# Patient Record
(demographics unavailable — no encounter records)

---

## 2024-10-10 NOTE — REASON FOR VISIT
[Initial] : initial visit for [Modified Barium Swallow] : modified barium swallow [FreeTextEntry1] : Dr. Joel Granado

## 2024-10-10 NOTE — ASSESSMENT
[FreeTextEntry1] : MODIFIED BARIUM SWALLOW STUDY/VIDEOFLUOROSCOPIC SWALLOW STUDY  Type of Evaluation & Procedure Code: Motion Flouro Evaluation of Swallow Function CPT code 68110   Patient Name: Chemo Al   Date of Exam: 10/10/2024  YOB: 2015  Referring Physician: Dr. Joel Granado   Referring Physician Specialty: Otolaryngology   Medical Diagnosis: Dysphagia, idiopathic (R13.10)   Treatment Diagnosis: Oropharyngeal dysphagia (R13.12)   Date of onset: Birth    REASON FOR REFERRAL:  Chemo Al, a 9-year-old male, was referred for a Modified Barium Swallow Study to objectively assess oropharyngeal swallow function and rule out silent penetration/aspiration due to recurrent croup.  Patient was accompanied to the evaluation by his mother, who provided the case history information, and served as a reliable informant. Current Feeding complaints reported by caregiver included selective eating.    BIRTH & MEDICAL HISTORY: Birth and Medical history gathered via parent interview and through review of electronic medical record.  Patient was born full term via normal vaginal route. No delivery/maternal complications reported, however patient with 2-day NICU stay due to jaundice. Medical history is significant for recurrent croup. No surgical history, hospital admissions, or history of intubation.    Current Respiratory Status: Room Air   Medications: Claritin as needed for seasonal allergies   Medical allergies: No known Medical allergies reported   Food allergies: No known food allergies reported    THERAPEUTIC HISTORY:  Per report, patient does not presently participate in therapeutic intervention.   Results of Previous Modified Barium Swallow Study (MBSS)/Videofluoroscopic Swallow Studies (VFSS):  None reported   Results of Previous Clinical swallow evaluations:  [facility] 10/3/2024: "DIET/FLUID RECOMMENDED CONSISTENCIES:  Continue oral feedings of thin fluids (IDDSI LEVEL 0) and regular solids (IDDSI LEVEL 7)." Please refer to patient's chart for full report.    FEEDING HISTORY:  Current Diet (based on the International Dysphagia Diet Standardization initiative [IDDSI]):  Exclusive oral diet feedings of regular solids (Level 7) and thin liquids (Level 0)   Feeding Method: Independent in self-feeding  Reported Endurance During Meals: Good  Feeding utensils: Appropriate feeding utensils  Feeding schedule: 3 meals  Temperature preference: No preference reported  Length of time taken to complete a feedin-45 minutes due to avoidance behaviors and distractions  History of feeding tube: None reported   ASSESSMENT:  Mental Status: Alert, responsive, and cooperative  Mobility Status: Ambulatory   POSTURAL CONTROL & MOVEMENT PATTERN:  Head Control: Within functional limits   Trunk Control: Within functional limits   Involuntary movement: Not observed      ORAL MOTOR ASSESSMENT:  A cursory oral mechanism examination of was conducted. Patient presented with facial symmetry and closed mouth posture while at rest.  Dentition: Within functional limits for age.  Oral Mucosa: Moist  Buccal: Within functional limits  Palate: Within functional limits  Labial: Adequate retraction and protrusion   Lingual: Adequate strength tone, and range of motion   Testing Tongue Position: Within functional limits  Oral Sensory: Within functional limits  Vocal Quality was perceptually judged to be within functional limits based on conversation   Reflexes: At this time, clinician did not elicit gag reflex.   MODIFIED BARIUM SWALLOW STUDY (MBSS)/VIDEOFLOUROSCOPIC SWALLOW STUDY (VFSS)ASSESSMENT:  The patient was assessed standing upright in both the lateral and anterior-posterior plane in the [facility], with Radiologist present.    Respiratory Status during Evaluation: Room Air   Patient's volitional cough was noted to be strong   Patient's volitional throat clear was noted to be strong   Secretion Management: Within functional limits   There was no coughing, no throat clearing, and [ ]no wet/gurgly vocal quality prior to PO administration.  The patient was alert and cooperative.   Preliminary Fluoroscopic Findings:  Adequate bilateral vocal fold motion during phonation.   Adequate velopharyngeal motion during phonation.   Adequate laryngeal elevation, pharyngeal constriction and epiglottic deflection during baseline swallow.   VFSS/MBS Eval: Solid Trials:   Consistencies Administered:  Regular (Level 7)    Feeding Method: Self-fed   Positioning: Standing   Endurance during meal/trials: Good   Oral Preparatory Stage for Solids:  Within functional limits. Oral preparatory phase marked by adequate acceptance, cohesive bolus formation, and age-appropriate mastication skill marked by adequate bite, lateralization of bolus to molars and demonstration of rotary chew pattern.    Oral Phase for Solids:  Within functional limits. Patient's oral phase was marked by coordinated oral motor movements with adequate preparation and manipulation of regular solid consistencies. Adequate anterior posterior movement of bolus, timely oral transit time, and adequate clearance of bolus from oral cavity appreciated.    Pharyngeal Phase for Solids:   Within Functional Limits  Pharyngeal stage was marked by   Intact: Yes   Initiation of the pharyngeal swallow: Within functional limits  Hyolaryngeal elevation: Adequate  Epiglottic retroflexion: Adequate   Pharyngeal transit time: Timely  Laryngeal penetration: not viewed  Aspiration: not viewed   Integrity of cricopharyngeal opening: yes   Residue: not viewed     Esophageal Phase:   Backflow not observed Please refer to physician's report with regard to details for esophageal stage of swallow.    VFSS/MBS Eval: Fluid Trials:  Consistencies Administered:  Thin (Level 0) via standard straw and open cup   Slightly Thick (Level 1) via standard straw    Feeding Method: Self-fed   Positioning: Standing   Endurance during meal/trials: Good    Oral Preparatory Stage for Fluids   Patient's oral preparatory phase was marked by adequate acceptance.  Age-appropriate cup drinking skill and age-appropriate straw drinking skill marked by adequate ability to create and maintain intraoral gradient pressure for fluid expression upward in straw. Cohesive bolus formation appreciated.    Oral Stage for Fluids:  Patient's oral stage was marked by coordinated lingual movements as he demonstrated good control and containment of thin fluids. Adequate anterior posterior movement of bolus, timely oral transfer, and adequate clearance appreciated.    Pharyngeal Phase for Fluids:    Within Functional Limits  Pharyngeal stage was marked by   Intact: Yes   Initiation of the pharyngeal swallow: Within functional limits  Hyolaryngeal elevation: Adequate  Epiglottic retroflexion: Adequate   Pharyngeal transit time: Timely   Laryngeal penetration:  -Thin (Level 0) via standard straw: 1 instance of trace, silent penetration with spontaneous retrieval viewed initial swallow. NOT viewed for multiple subsequent trials.  -Thin (Level 0) via open cup: not viewed  -Slightly thick (Level 1): not viewed    Aspiration: Not viewed   Integrity of cricopharyngeal opening: yes   Trace residue: not viewed    Esophageal Phase:   Backflow not observed Please refer to physician's report with regard to details for esophageal stage of swallow.    ROSENBECK'S ASPIRATION-PENETRATION SCALE  Aspiration - Penetration Scale    (Foziabek et al Dysphagia 11:93-98 (1996), Aspiration-Penetration Scale)  1.    Material does not enter the airway  2.    Material enters the airway, remains above the vocal folds, and is ejected from the airway  3.    Material enters the airway, remains above the vocal folds, and is not ejected  4.    Material enters the airway, contacts the vocal folds, and is ejected from the airway  5.    Material enters the airway, contacts the vocal folds, and is not ejected from the airway  6.    Material enters the airway, passes below the vocal folds and is ejected into the larynx or out of the airway  7.    Material enters the airway, passes below the vocal folds, and is not ejected from the trachea despite effort  8.    Material enters the airway, passes below the vocal folds, and no effort is made to eject   TRIALS ADMINISTERED AND SEVERITY SCALE:   1-Regular (Level 7)   1- Slightly Thick (Level 1)  1-Thin (Level 0) via open cup   2-Thin (Level 0) via standard straw    PROGNOSIS:  Prognosis is good for safe and adequate oral intake of the recommended consistencies as outlined below, based on the results of today's assessment and the medical history reported.   EDUCATION:  Discussed results of evaluation with mother   Mother expressed understanding of evaluation and agreement with goals and treatment plan  Provided written recommendations  Provided [facility]eding[facility]Swallowing referral list   IMPRESSIONS: Chemo Al, a 9-year-old male, was referred for a Modified Barium Swallow Study to objectively assess oropharyngeal swallow function and rule out silent penetration/aspiration due to recurrent croup. Patient presents with intact oropharyngeal swallow function. Oral phase notable for coordinated oral motor movements. Age-appropriate mastication skill appreciated for regular solid consistencies and adequate control and containment of thin fluids.  Pharyngeal phase marked by timely pharyngeal swallow initiation, adequate hyolaryngeal elevation/excursion and complete epiglottic deflection.  1 instance of trace, silent penetration viewed during the initial swallow of thin fluids from a straw with spontaneous retrieval; NOT viewed for multiple subsequent trials of thins from both a straw and open cup. NO laryngeal penetration/tracheal aspiration viewed for regular solids, thin liquids via open cup, and slightly thick liquids.  At this time, recommend continuation of regular solids and thin liquids.   DIET/FLUID RECOMMENDED CONSISTENCIES: Continue regular solids (Level 7) and thin liquids (Level 0)    ADDITIONAL RECOMMENDATIONS:  -Initiate feeding therapy through community based-program given reported selective eating behaviors. Long and short term goals to be determined by treating Speech-Language Pathologist based upon the aforementioned findings.  -Follow up with established providers as scheduled   -Monitor for signs and symptoms of aspiration and or laryngeal penetration, such as change of breathing pattern, cough, throat clearing, gurgly/wet voice, color change, fever, pneumonia, and upper respiratory infection. If noted: Discontinue oral intake, provide non-oral nutrition/hydration/meds, and contact physician immediately.   This referral process was reviewed with the parent. No further recommendations were made at this time. Please feel free to contact the Center at [phone], if any additional information is needed.   Eleni Ryder M.A., CCC-SLP  [place]#[id number]

## 2024-12-20 NOTE — HISTORY OF PRESENT ILLNESS
[de-identified] : 9 year old male with hx of recurrent croup presents for post op s/p  Microlaryngoscopy, bronchoscopy. 11/4/24 Cine 10/10/24 Snoring at night, increasing in frequency, grinds teeth but no choking or apneas Swallowing has been improved, no longer feels the throat tightness Breathing has been stable, occasional PND No recent fevers or infections